# Patient Record
(demographics unavailable — no encounter records)

---

## 2024-11-07 NOTE — HEALTH RISK ASSESSMENT
[Good] : ~his/her~  mood as  good [No] : In the past 12 months have you used drugs other than those required for medical reasons? No [No falls in past year] : Patient reported no falls in the past year [0] : 2) Feeling down, depressed, or hopeless: Not at all (0) [PHQ-2 Negative - No further assessment needed] : PHQ-2 Negative - No further assessment needed [Never] : Never [Patient reported mammogram was normal] : Patient reported mammogram was normal [Patient reported PAP Smear was normal] : Patient reported PAP Smear was normal [Patient reported colonoscopy was normal] : Patient reported colonoscopy was normal [With Family] : lives with family [Retired] : retired [Single] : single [Fully functional (bathing, dressing, toileting, transferring, walking, feeding)] : Fully functional (bathing, dressing, toileting, transferring, walking, feeding) [Fully functional (using the telephone, shopping, preparing meals, housekeeping, doing laundry, using] : Fully functional and needs no help or supervision to perform IADLs (using the telephone, shopping, preparing meals, housekeeping, doing laundry, using transportation, managing medications and managing finances) [Designated Healthcare Proxy] : Designated healthcare proxy [Name: ___] : Health Care Proxy's Name: [unfilled]  [Relationship: ___] : Relationship: [unfilled] [[id number]] : 0 [MammogramDate] : 2019 [PapSmearDate] : 2019 [ColonoscopyDate] : more than 5 years ago [AdvancecareDate] : 11/7/24

## 2024-11-07 NOTE — HEALTH RISK ASSESSMENT
[Good] : ~his/her~  mood as  good [No] : In the past 12 months have you used drugs other than those required for medical reasons? No [No falls in past year] : Patient reported no falls in the past year [0] : 2) Feeling down, depressed, or hopeless: Not at all (0) [PHQ-2 Negative - No further assessment needed] : PHQ-2 Negative - No further assessment needed [Never] : Never [Patient reported mammogram was normal] : Patient reported mammogram was normal [Patient reported PAP Smear was normal] : Patient reported PAP Smear was normal [Patient reported colonoscopy was normal] : Patient reported colonoscopy was normal [With Family] : lives with family [Retired] : retired [Single] : single [Fully functional (bathing, dressing, toileting, transferring, walking, feeding)] : Fully functional (bathing, dressing, toileting, transferring, walking, feeding) [Fully functional (using the telephone, shopping, preparing meals, housekeeping, doing laundry, using] : Fully functional and needs no help or supervision to perform IADLs (using the telephone, shopping, preparing meals, housekeeping, doing laundry, using transportation, managing medications and managing finances) [Designated Healthcare Proxy] : Designated healthcare proxy [Name: ___] : Health Care Proxy's Name: [unfilled]  [Relationship: ___] : Relationship: [unfilled] [[id number]] : 0 [MammogramDate] : 2019 [PapSmearDate] : 2019 [ColonoscopyDate] : more than 5 years ago [AdvancecareDate] : 11/7/24

## 2024-12-11 NOTE — DISCUSSION/SUMMARY
[FreeTextEntry1] : In a summary Mary Elias is a middle- aged female with Hypertension, continue Losartan. Cut down on salt intake. Shortness of breath on exertion, Echo done showed normal LV systolic function and mild pulmonary hypertension. Echo results explained. Hypercholesterolemia, low cholesterol diet trial. Follow up in 3 months. Spent 50 minutes on encounter.

## 2024-12-11 NOTE — HISTORY OF PRESENT ILLNESS
[de-identified] : 68 years old female with past medical history of diabetes, hypertension, hyperlipidemia came back to review her most recent test results.  Total cholesterol was 225, HDL 48, , vitamin D 20.2. Reports were reviewed with pt in details. Other blood tests came back wnl.

## 2024-12-11 NOTE — HISTORY OF PRESENT ILLNESS
[FreeTextEntry1] : Mary Humphrey is a 68- year- old female with Hypertension and Hypercholesterolemia comes for cardiac evaluation. Denies any chest pain or palpitations. Mild shortness of breath on exertion which is relieved with rest in few minutes. Started taking BP medication couple of days ago. Lipid profile reviewed and Chol is high. Normal HbA1c levels. EKG reviewed. Physically active.

## 2024-12-11 NOTE — HISTORY OF PRESENT ILLNESS
[de-identified] : 68 years old female with past medical history of diabetes, hypertension, hyperlipidemia came back to review her most recent test results.  Total cholesterol was 225, HDL 48, , vitamin D 20.2. Reports were reviewed with pt in details. Other blood tests came back wnl.

## 2025-03-05 NOTE — HISTORY OF PRESENT ILLNESS
[FreeTextEntry1] : Mary Humphrey is a 68- year- old female with Hypertension and Hypercholesterolemia comes for follow up visit. Denies any chest pain or palpitations. Mild shortness of breath on exertion which is relieved with rest in few minutes. Noncompliant to BP medications. Lipid profile reviewed and Chol is high. Normal HbA1c levels. EKG reviewed. Physically active.  Controlled with current regime

## 2025-03-05 NOTE — DISCUSSION/SUMMARY
[FreeTextEntry1] : In a summary Mary Elias is a middle- aged female with Hypertension, counseled her to take Losartan regularly. Cut down on salt intake. Shortness of breath on exertion, cardiac work up done showed normal LV systolic function and negative for ischemia.  Hypercholesterolemia, low cholesterol diet trial. Follow up in 36months.

## 2025-03-13 NOTE — ASSESSMENT
[Asymptomatic person age 45 years and older] : Asymptomatic person age 45 years and older [] : A history of uncontrolled high blood pressure? Yes [No] : Assessment: This patient is a good candidate for a direct referral for colonoscopy? No [FreeTextEntry1] : Dr. West [FreeTextEntry2] : 3/13/25 [de-identified] : Patient with uncontrolled HTN [de-identified] : Patient with uncontrolled HTN and states she can get Shortness of breath on exertion [FreeTextEntry4] : Patient with uncontrolled HTN and states she can get Shortness of breath on exertion  Should see GI

## 2025-03-13 NOTE — HISTORY OF PRESENT ILLNESS
[de-identified] : 68 years old female with past medical history of diabetes, hypertension, hyperlipidemia came back to review her most recent test results. Total cholesterol was 230, .  Bone density test showed T score -1.0 at lumbar spine and 0.9 at left femoral neck and 1.3 at left total hip. Mammogram showed negative for malignancy.  Reports were reviewed with pt in details. Other blood tests came back wnl.

## 2025-03-13 NOTE — OTHER
[Central] : Central [FreeTextEntry1] : 03/13/2025  Subject: Direct Access Colonoscopy Referral - *NOT ELIGIBLE Candidate - Requires Consultation*     Hello, This is the Nurse Navigator for the Direct Access Colonoscopy Program. I have reviewed the referral for SERG ADORNO and completed the telephonic screening process. I have documented all screening questions and responses in the patient's medical record for your review.   Based on the screening results, the patient is deemed: ** NOT ELIGIBLE** for Direct Access Colonoscopy and would benefit from a consultation appointment prior to the procedure. Kindly arrange a GI consultation for this patient   Please contact me if you require additional information or clarification. Tushar Charles RN Direct Access Colonoscopy Program [FreeTextEntry2] : [place]

## 2025-03-13 NOTE — HISTORY OF PRESENT ILLNESS
[de-identified] : 68 years old female with past medical history of diabetes, hypertension, hyperlipidemia came back to review her most recent test results. Total cholesterol was 230, .  Bone density test showed T score -1.0 at lumbar spine and 0.9 at left femoral neck and 1.3 at left total hip. Mammogram showed negative for malignancy.  Reports were reviewed with pt in details. Other blood tests came back wnl.

## 2025-06-12 NOTE — HISTORY OF PRESENT ILLNESS
[de-identified] : 68 years old female with past medical history of diabetes, hypertension, hyperlipidemia came back for follow up.

## 2025-06-12 NOTE — HISTORY OF PRESENT ILLNESS
[de-identified] : 68 years old female with past medical history of diabetes, hypertension, hyperlipidemia came back for follow up.

## 2025-06-27 NOTE — HISTORY OF PRESENT ILLNESS
[de-identified] : 68 years old female with past medical history of diabetes, hypertension, hyperlipidemia came back to review her most recent test results. Total cholesterol was 209, . HDL 46, fasting glucose 106, HBA1c 5.6, UA positive for moderate leukocyte esterase, WBC 6/HPF. Reports were reviewed with pt in details. Other blood tests came back wnl.

## 2025-06-27 NOTE — HISTORY OF PRESENT ILLNESS
[de-identified] : 68 years old female with past medical history of diabetes, hypertension, hyperlipidemia came back to review her most recent test results. Total cholesterol was 209, . HDL 46, fasting glucose 106, HBA1c 5.6, UA positive for moderate leukocyte esterase, WBC 6/HPF. Reports were reviewed with pt in details. Other blood tests came back wnl.